# Patient Record
Sex: FEMALE | Race: WHITE | NOT HISPANIC OR LATINO | Employment: FULL TIME | URBAN - METROPOLITAN AREA
[De-identification: names, ages, dates, MRNs, and addresses within clinical notes are randomized per-mention and may not be internally consistent; named-entity substitution may affect disease eponyms.]

---

## 2023-02-11 ENCOUNTER — APPOINTMENT (OUTPATIENT)
Dept: RADIOLOGY | Facility: MEDICAL CENTER | Age: 38
End: 2023-02-11
Attending: EMERGENCY MEDICINE
Payer: COMMERCIAL

## 2023-02-11 ENCOUNTER — HOSPITAL ENCOUNTER (EMERGENCY)
Facility: MEDICAL CENTER | Age: 38
End: 2023-02-11
Attending: EMERGENCY MEDICINE
Payer: COMMERCIAL

## 2023-02-11 VITALS
TEMPERATURE: 97.2 F | BODY MASS INDEX: 19.32 KG/M2 | RESPIRATION RATE: 14 BRPM | DIASTOLIC BLOOD PRESSURE: 85 MMHG | WEIGHT: 105 LBS | HEIGHT: 62 IN | HEART RATE: 86 BPM | SYSTOLIC BLOOD PRESSURE: 122 MMHG | OXYGEN SATURATION: 98 %

## 2023-02-11 DIAGNOSIS — G57.62 MORTON'S NEUROMA OF LEFT FOOT: ICD-10-CM

## 2023-02-11 DIAGNOSIS — S63.641A GAMEKEEPER'S THUMB OF RIGHT HAND, INITIAL ENCOUNTER: ICD-10-CM

## 2023-02-11 PROCEDURE — 29125 APPL SHORT ARM SPLINT STATIC: CPT

## 2023-02-11 PROCEDURE — 99283 EMERGENCY DEPT VISIT LOW MDM: CPT

## 2023-02-11 PROCEDURE — 73130 X-RAY EXAM OF HAND: CPT | Mod: RT

## 2023-02-11 PROCEDURE — 302874 HCHG BANDAGE ACE 2 OR 3""

## 2023-02-11 PROCEDURE — 73630 X-RAY EXAM OF FOOT: CPT | Mod: LT

## 2023-02-11 NOTE — ED PROVIDER NOTES
"History obtained from: Patient.     CHIEF COMPLAINT  Chief Complaint   Patient presents with    Hand Pain     Right hand - x3 months    Foot Pain     X10 days - left     Seen at 12:23 PM    HPI  Cristina Nunn is a 37 y.o. right-hand-dominant female presents with right hand pain and left foot pain.  The patient has had 3 months of right hand pain, mostly along the right thumb.  It is worse throughout the day, best in the morning.  She first thinks she injured it when she was mountain biking, fell on the outstretched hand.  She was not evaluated except for telehealth, she has never had any x-rays of the hand.    The other issue is left foot pain.  She has had a week and a half of left first metatarsal pain.  The pain initially began after wearing some ill fitting high-heeled boots, the pain waxed and waned for the past week, got acutely worse last night with some feeling of crepitus, though did improve today.  She does have a history of planter fasciitis.    The reason for the ER visit today is that she typically works in [place] which is where her insurance is based out of, she has an HMO and they would require any prior authorization for visits that are out of network.  Because she is out of network now the ER visit will be covered where is anything else would not be.    REVIEW OF SYSTEMS  See HPI  PAST MEDICAL HISTORY   Planter fasciitis    SOCIAL HISTORY  Social History     Tobacco Use    Smoking status: Not on file    Smokeless tobacco: Not on file   Substance and Sexual Activity    Alcohol use: Not on file    Drug use: Not on file    Sexual activity: Not on file       SURGICAL HISTORY  patient denies any surgical history    CURRENT MEDICATIONS  Reviewed.  See Encounter Summary.     ALLERGIES  Allergies   Allergen Reactions    Penicillins Hives       PHYSICAL EXAM  VITAL SIGNS: /85   Pulse 86   Temp 36.2 °C (97.2 °F) (Temporal)   Resp 14   Ht 1.575 m (5' 2\")   Wt 47.6 kg (105 lb)   LMP " 01/25/2023 (Exact Date)   SpO2 98%   BMI 19.20 kg/m²   Constitutional: Awake, alert in no apparent distress.  HENT: Normocephalic, Bilateral external ears normal. Nose normal.   Eyes: Conjunctiva normal, non-icteric, EOMI.    Thorax & Lungs: Easy unlabored respirations  Cardiovascular:    Abdomen:  No distention  Skin: Visualized skin is  Dry, No erythema, No rash.   Extremities: Right hand: Normal appearance, no snuffbox tenderness, no tenderness over the scaphoid lunate region.  The patient does appear to have some UCL laxity of the thumb.    Left foot: Normal in appearance, no tenderness along the fifth metatarsal, no midfoot tenderness, no malleoli or tenderness.  The patient has point tenderness between the first and second MTP region.    Neurologic: Alert, Grossly non-focal.   Psychiatric: Affect and Mood normal    RADIOLOGY  DX-FOOT-COMPLETE 3+ LEFT   Final Result      No fracture or dislocation. Preserved joint spaces.      DX-HAND 3+ RIGHT   Final Result      No acute osseous abnormality.          Prior medical record, nursing notes and vital signs were reviewed. (See chart for details)    Differential diagnosis: Mechanism of the hand injury would suggest scaphoid fracture with AVN, though exam does not support this.  Other consideration would be scapholunate dissociation, also exam does not quite support this.  She does have some laxity of the UCL, therefore gamekeeper's thumb under consideration.    As for the foot, not concerning for gout, no evidence of hallux valgus deformity, location of pain not concerning for plantar fasciitis, most likely Engle's neuroma which would be very challenging to evaluate on x-ray.    Decision Making:  This is a pleasant 37 y.o. year old female who presents with subacute to chronic right hand pain, left foot pain.  Please see differential and discussion as above.  X-rays negative.  Patient will be placed in a thumb spica for the possible gamekeeper's thumb of the right  hand, though the utility is questionable as the injury occurred 3 months ago, therefore if she does indeed have a ligamentous rupture it would unlikely heal with immobilization.  Recommend she follow-up with a hand surgeon for this, with regards to the left foot she can call her insurance to see what podiatrist they recommend, otherwise she can buy some custom orthotics or even make some orthotics and cut a hole out where the location of the pain is.  Reassurance provided.    DISPOSITION:  Patient will be discharged home in good condition.    Discharge Medications:  There are no discharge medications for this patient.      The patient was discharged home (see d/c instructions) and told to return immediately for any signs or symptoms listed, or any worsening at all.  The patient verbally agreed to the discharge precautions and follow-up plan which is documented in EPIC.        FINAL IMPRESSION  1. Gamekeeper's thumb of right hand, initial encounter    2. Engle's neuroma of left foot

## 2023-02-11 NOTE — ED TRIAGE NOTES
"Chief Complaint   Patient presents with    Hand Pain     Right hand - x3 months    Foot Pain     X10 days - left       Pt is alert and oriented, speaking in full sentences, follows commands and responds appropriately to questions.      Pt placed in lobby. Pt educated on triage process and apologized for wait time. Pt encouraged to alert staff for any changes.     Patient and staff wearing appropriate PPE      /71   Pulse 75   Temp 36.8 °C (98.2 °F) (Temporal)   Resp 16   Ht 1.575 m (5' 2\")   Wt 47.6 kg (105 lb)   SpO2 99%       "

## 2023-02-11 NOTE — ED NOTES
Discharge education provided. Discharge paperwork signed by pt. All questions answered. All belongings with pt. Pt ambulated to lobby unassisted with steady gait.

## 2023-04-24 ENCOUNTER — HOSPITAL ENCOUNTER (EMERGENCY)
Facility: MEDICAL CENTER | Age: 38
End: 2023-04-24
Attending: EMERGENCY MEDICINE
Payer: COMMERCIAL

## 2023-04-24 VITALS
RESPIRATION RATE: 19 BRPM | BODY MASS INDEX: 18.01 KG/M2 | WEIGHT: 97.88 LBS | SYSTOLIC BLOOD PRESSURE: 93 MMHG | HEIGHT: 62 IN | OXYGEN SATURATION: 99 % | DIASTOLIC BLOOD PRESSURE: 50 MMHG | TEMPERATURE: 98.2 F | HEART RATE: 65 BPM

## 2023-04-24 DIAGNOSIS — R29.0 CARPOPEDAL SPASM: ICD-10-CM

## 2023-04-24 DIAGNOSIS — I95.1 ORTHOSTATIC SYNCOPE: ICD-10-CM

## 2023-04-24 LAB — EKG IMPRESSION: NORMAL

## 2023-04-24 PROCEDURE — 93005 ELECTROCARDIOGRAM TRACING: CPT

## 2023-04-24 PROCEDURE — 99283 EMERGENCY DEPT VISIT LOW MDM: CPT

## 2023-04-24 PROCEDURE — 93005 ELECTROCARDIOGRAM TRACING: CPT | Performed by: EMERGENCY MEDICINE

## 2023-04-25 NOTE — ED TRIAGE NOTES
"Chief Complaint   Patient presents with    Syncope     Pt states she was getting out of bathtub when she starting feeling dizzy/lightheaded and nauseous. -head strike.  Pt states initially following event she felt bilateral numbness/tingling in hands and feet that is improving.          39 yo F to triage for above complaint. Hx of same. Denies CP. EKG ordered.     Pt placed in lobby. Pt educated on triage process. Pt encouraged to alert staff for any changes.     Patient and staff wearing appropriate PPE    BP 98/56   Pulse 88   Temp 36 °C (96.8 °F) (Temporal)   Resp 16   Ht 1.575 m (5' 2\")   Wt 44.4 kg (97 lb 14.2 oz)   SpO2 100%   BMI 17.90 kg/m²     "

## 2023-04-25 NOTE — ED NOTES
Pt discharged to ED Lobby. GCS 15. Pt in possession of belongings. Pt provided discharge education and information pertaining to medications and follow up appointments. Pt received copy of discharge instructions and verbalized understanding.     Vitals:    04/24/23 2100   BP: 93/50   Pulse: 65   Resp: 19   Temp: 36.8 °C (98.2 °F)   SpO2: 99%

## 2023-04-25 NOTE — DISCHARGE INSTRUCTIONS
Syncope (Fainting Episode)    Return for fainting without warning, fainting during exertion or for fainting with leg swelling or headache.      You have had a syncopal (fainting) spell. A fainting episode is a sudden, short-lived loss of consciousness. It results in complete recovery. It occurs because there has been a temporary shortage of oxygen and/or glucose (sugar) to the brain.    causes  Blood pressure pills and other medications that may lower blood pressure below normal. Sudden changes in posture (sudden standing).  Over medication. Take your medications as directed.  Standing too long. This can cause blood to pool in the legs.  Seizure disorders.  Low blood sugar (hypoglycemia) of diabetes. This more commonly causes coma.  Bearing down to go to the bathroom. This can cause your blood pressure to rise suddenly. Your body compensates by making the blood pressure too low when you stop bearing down.  Hardening of the arteries where the brain temporarily does not receive enough blood.  Irregular heart beat and circulatory problems.  Fear, emotional distress, injury, sight of blood, or illness.    Your caregiver will send you home if the syncope was from benign causes (not a reason to worry). Depending on your age and health, you may stay to be monitored and observed. If you return home, have someone stay with you if your caregiver feels that is desirable.    It is very important to keep all follow-up referrals and appointments in order to properly manage this condition.    This is a serious problem which can lead to serious illness and death if not carefully managed.      WARNING: Do not drive or operate machinery until your caregiver feels that it is safe for you to do so.    seek immediate medical attention if:  You have another fainting episode or faint while lying or sitting down. DO NOT DRIVE YOURSELF. Call 911 if no other help is available.  You have chest pain, nausea (feeling sick to your stomach),  vomiting or abdominal pain.  You have an irregular heartbeat or one that is very fast (pulse over 120 beats per minute).  You have a loss of feeling in some part of your body or lose movement in your arms or legs.  You have difficulty with speech, confusion, severe weakness, or visual problems.  You become sweaty and/or feel light headed.    MAKE SURE YOU ARE RE-CHECKED AS INSTRUCTED    Document Released: 12/18/2006  Document Re-Released: 01/29/2008  [employer]® Patient Information ©2008 [employer], [employer].

## 2023-04-25 NOTE — ED PROVIDER NOTES
ED Provider Note    CHIEF COMPLAINT  Chief Complaint   Patient presents with    Syncope     Pt states she was getting out of bathtub when she starting feeling dizzy/lightheaded and nauseous. -head strike.  Pt states initially following event she felt bilateral numbness/tingling in hands and feet that is improving.          LIMITATION TO HISTORY   Select: None    HPI    Cristina Nunn is a 38 y.o. female who presents to the Emergency Department for syncope and carpopedal spasm.  The patient had not eaten dinner tonight and did not drink much today.  She took a hot bath and when she stood felt dizzy and nauseated.  She left the room continued to feel poorly, crawled back to the bathroom and fainted on the floor.  When she awakened she developed tingling in the hands and feet and then spasm of the hands which was painful and lasted for about 10 minutes.  That since resolved.  She has fainted before.  She normally has low normal blood pressure.  No palpitations arrhythmia heart failure headache calf pain leg swelling vomiting diarrhea pregnancy fever urinary symptoms GI bleed or vaginal bleeding has occurred.    OUTSIDE HISTORIAN(S):  Select: None    EXTERNAL RECORDS REVIEWED  Select: None    REVIEW OF SYSTEMS  Pertinent positives include: Syncope dizziness nausea carpopedal spasm tingling.  Pertinent negatives include: Pain palpitations leg swelling.      PAST MEDICAL HISTORY  Past Medical History:   Diagnosis Date    Raynaud disease        FAMILY HISTORY  No family history of sudden death or heart failure    SOCIAL HISTORY  Social History     Tobacco Use    Smoking status: Never    Smokeless tobacco: Never   Vaping Use    Vaping Use: Never used   Substance Use Topics    Alcohol use: Never    Drug use: Never     Social History     Substance and Sexual Activity   Drug Use Never       CURRENT MEDICATIONS  None    ALLERGIES  Allergies   Allergen Reactions    Penicillins Hives       PHYSICAL EXAM  VITAL SIGNS: BP 98/56   " Pulse 88   Temp 36 °C (96.8 °F) (Temporal)   Resp 16   Ht 1.575 m (5' 2\")   Wt 44.4 kg (97 lb 14.2 oz)   SpO2 100%   BMI 17.90 kg/m²   Reviewed and normal blood pressure  Constitutional: Well developed, Well nourished, thin, completely well-appearing.  HENT: Normocephalic, atraumatic, bilateral external ears normal, No intraoral erythema, edema, exudate  Eyes: PERRLA, conjunctiva pink, no scleral icterus.   Cardiovascular: Regular rate and rhythm. No murmurs, rubs or gallops.  No dependent edema or calf tenderness  Respiratory: Lungs clear to auscultation bilaterally. No wheezes, rales, or rhonchi.  Abdominal:  Abdomen soft, non-tender, non distended. No rebound, or guarding.    Skin: No erythema, no rash. No wounds or bruising.  Genitourinary: No costovertebral angle tenderness.   Musculoskeletal: no deformities.   Neurologic: Alert & oriented x 3, cranial nerves 2-12 intact by passive exam.  Moves 4 limbs with symmetric strength.  Psychiatric: Affect normal, Judgment normal, Mood normal.         EKG Interpretation by me    Indication: Syncope    Rhythm: normal sinus   Rate: Normal at 70  Axis: normal  Ectopy: none  Conduction: normal  ST/T Waves: no acute change  Q Waves: none  R Wave progression: normal  Hypertrophy changes: Absent    Clinical Impression: Normal sinus rhythm      ED COURSE:      INTERVENTIONS BY ME:  Declined by patient and her  nurse      MEDICAL DECISION MAKING:  PROBLEMS EVALUATED THIS VISIT:  This patient presents with syncope that was very clearly orthostatic syncope.  She is likely mildly dehydrated.  Sitting in the hot tub likely caused vasodilation and more relative hypovolemia.  Afterwards it appears she hyperventilated to the point she developed carpopedal spasms.  I discussed obtaining labs, checking orthostatic vital signs and giving IV fluids but the patient and her significant other declined.  I believe this is safe.    RISK:  Low    Diagnostic tests and prescription " drugs considered including, but not limited to: Select: Basic metabolic panel, pregnancy test, orthostatics, IV saline infusion.       PLAN:    Syncope handout given    Return for fainting without warning with exertion with headache with chest pain with palpitations or with painful leg swelling    Followup:  [facility] Emergency Dept  [facility] [facility] [address] [phone]    As needed      CONDITION: Stable.     FINAL IMPRESSION  1. Orthostatic syncope    2. Carpopedal spasm      Electronically signed by: Enoc Lucio M.D., 4/24/2023 9:37 PM

## 2023-04-25 NOTE — ED NOTES
PT to room via WC. Transferred self to bed. Changed into gown and placed on monitor. Chart up for ERP.

## 2023-10-24 PROBLEM — M65.30 TRIGGER FINGER OF LEFT HAND: Status: ACTIVE | Noted: 2023-10-24

## 2024-01-02 ENCOUNTER — HOSPITAL ENCOUNTER (OUTPATIENT)
Dept: LAB | Facility: MEDICAL CENTER | Age: 39
End: 2024-01-02
Payer: COMMERCIAL

## 2024-01-02 PROCEDURE — 88175 CYTOPATH C/V AUTO FLUID REDO: CPT

## 2024-01-02 PROCEDURE — 87624 HPV HI-RISK TYP POOLED RSLT: CPT

## 2024-01-05 ENCOUNTER — APPOINTMENT (OUTPATIENT)
Dept: RADIOLOGY | Facility: MEDICAL CENTER | Age: 39
End: 2024-01-05
Attending: FAMILY MEDICINE
Payer: COMMERCIAL

## 2024-01-08 LAB
CYTOLOGIST CVX/VAG CYTO: ABNORMAL
CYTOLOGY CVX/VAG DOC CYTO: ABNORMAL
CYTOLOGY CVX/VAG DOC THIN PREP: ABNORMAL
DX ICD CODE: ABNORMAL
HPV I/H RISK 4 DNA CVX QL PROBE+SIG AMP: NEGATIVE
NOTE NL11727A: ABNORMAL
OTHER STN SPEC: ABNORMAL
PATHOLOGIST CVX/VAG CYTO: ABNORMAL
STAT OF ADQ CVX/VAG CYTO-IMP: ABNORMAL

## 2024-01-11 ENCOUNTER — HOSPITAL ENCOUNTER (OUTPATIENT)
Dept: RADIOLOGY | Facility: MEDICAL CENTER | Age: 39
End: 2024-01-11
Attending: FAMILY MEDICINE
Payer: COMMERCIAL

## 2024-01-11 DIAGNOSIS — Z13.820 OSTEOPOROSIS SCREENING: ICD-10-CM

## 2024-01-11 DIAGNOSIS — Z87.312 HISTORY OF STRESS FRACTURE: ICD-10-CM

## 2024-01-11 PROCEDURE — 77080 DXA BONE DENSITY AXIAL: CPT

## 2024-02-14 ENCOUNTER — HOSPITAL ENCOUNTER (OUTPATIENT)
Dept: LAB | Facility: MEDICAL CENTER | Age: 39
End: 2024-02-14
Attending: INTERNAL MEDICINE
Payer: COMMERCIAL

## 2024-02-14 LAB
CRP SERPL HS-MCNC: <0.3 MG/DL (ref 0–0.75)
THYROPEROXIDASE AB SERPL-ACNC: 13 IU/ML (ref 0–9)

## 2024-02-14 PROCEDURE — 86140 C-REACTIVE PROTEIN: CPT

## 2024-02-14 PROCEDURE — 86376 MICROSOMAL ANTIBODY EACH: CPT

## 2024-02-14 PROCEDURE — 86008 ALLG SPEC IGE RECOMB EA: CPT | Mod: 91

## 2024-02-14 PROCEDURE — 85652 RBC SED RATE AUTOMATED: CPT

## 2024-02-14 PROCEDURE — 82784 ASSAY IGA/IGD/IGG/IGM EACH: CPT

## 2024-02-14 PROCEDURE — 36415 COLL VENOUS BLD VENIPUNCTURE: CPT

## 2024-02-14 PROCEDURE — 83520 IMMUNOASSAY QUANT NOS NONAB: CPT

## 2024-02-14 PROCEDURE — 88185 FLOWCYTOMETRY/TC ADD-ON: CPT | Mod: 91

## 2024-02-14 PROCEDURE — 86800 THYROGLOBULIN ANTIBODY: CPT

## 2024-02-14 PROCEDURE — 88184 FLOWCYTOMETRY/ TC 1 MARKER: CPT

## 2024-02-14 PROCEDURE — 86003 ALLG SPEC IGE CRUDE XTRC EA: CPT

## 2024-02-14 PROCEDURE — 86231 EMA EACH IG CLASS: CPT

## 2024-02-14 PROCEDURE — 83516 IMMUNOASSAY NONANTIBODY: CPT | Mod: 91

## 2024-02-14 PROCEDURE — 86364 TISS TRNSGLTMNASE EA IG CLAS: CPT

## 2024-02-14 PROCEDURE — 82785 ASSAY OF IGE: CPT

## 2024-02-15 LAB — ERYTHROCYTE [SEDIMENTATION RATE] IN BLOOD BY WESTERGREN METHOD: 7 MM/HOUR (ref 0–25)

## 2024-02-16 LAB
DEPRECATED MISC ALLERGEN IGE RAST QL: NORMAL
EGG WHITE IGE QN: 0.16 KU/L
EGG YOLK IGE QN: <0.1 KU/L
IGE SERPL-ACNC: 110 KU/L
OVALB IGE QN: <0.1 KU/L
OVOMUCOID IGE QN: 0.12 KU/L
THYROGLOB AB SERPL-ACNC: <0.9 IU/ML (ref 0–4)
TRYPTASE SERPL-MCNC: 7 UG/L

## 2024-02-18 LAB
ENDOMYSIUM IGA TITR SER IF: NORMAL {TITER}
TTG IGA SER IA-ACNC: <1.02 FLU (ref 0–4.99)

## 2024-02-20 LAB — URTIIND BASO ACT Q4770: 0 %

## 2024-02-21 LAB
MYELOPEROXIDASE AB SER-ACNC: 0 AU/ML (ref 0–19)
PROTEINASE3 AB SER-ACNC: 0 AU/ML (ref 0–19)

## 2024-02-22 LAB — IGA SERPL-MCNC: 196 MG/DL (ref 68–408)

## 2024-03-04 ENCOUNTER — HOSPITAL ENCOUNTER (OUTPATIENT)
Dept: RADIOLOGY | Facility: MEDICAL CENTER | Age: 39
End: 2024-03-04
Attending: FAMILY MEDICINE
Payer: COMMERCIAL

## 2024-03-04 DIAGNOSIS — R10.12 ABDOMINAL PAIN, LEFT UPPER QUADRANT: ICD-10-CM

## 2024-03-04 PROCEDURE — 76705 ECHO EXAM OF ABDOMEN: CPT

## 2024-03-12 ENCOUNTER — APPOINTMENT (OUTPATIENT)
Dept: RADIOLOGY | Facility: MEDICAL CENTER | Age: 39
End: 2024-03-12
Attending: FAMILY MEDICINE
Payer: COMMERCIAL

## 2024-03-12 DIAGNOSIS — R10.12 ABDOMINAL PAIN, LEFT UPPER QUADRANT: ICD-10-CM

## 2024-03-17 ENCOUNTER — APPOINTMENT (OUTPATIENT)
Dept: RADIOLOGY | Facility: MEDICAL CENTER | Age: 39
End: 2024-03-17
Attending: FAMILY MEDICINE
Payer: COMMERCIAL

## 2024-03-18 ENCOUNTER — HOSPITAL ENCOUNTER (OUTPATIENT)
Dept: LAB | Facility: MEDICAL CENTER | Age: 39
End: 2024-03-18
Attending: FAMILY MEDICINE
Payer: COMMERCIAL

## 2024-03-18 LAB
T3FREE SERPL-MCNC: 2.57 PG/ML (ref 2–4.4)
T4 FREE SERPL-MCNC: 1.35 NG/DL (ref 0.93–1.7)
THYROPEROXIDASE AB SERPL-ACNC: <9 IU/ML (ref 0–9)
TSH SERPL DL<=0.005 MIU/L-ACNC: 1.36 UIU/ML (ref 0.38–5.33)

## 2024-03-18 PROCEDURE — 84443 ASSAY THYROID STIM HORMONE: CPT

## 2024-03-18 PROCEDURE — 86800 THYROGLOBULIN ANTIBODY: CPT

## 2024-03-18 PROCEDURE — 36415 COLL VENOUS BLD VENIPUNCTURE: CPT

## 2024-03-18 PROCEDURE — 84482 T3 REVERSE: CPT

## 2024-03-18 PROCEDURE — 84439 ASSAY OF FREE THYROXINE: CPT

## 2024-03-18 PROCEDURE — 86376 MICROSOMAL ANTIBODY EACH: CPT

## 2024-03-18 PROCEDURE — 84481 FREE ASSAY (FT-3): CPT

## 2024-03-20 LAB — THYROGLOB AB SERPL-ACNC: <0.9 IU/ML (ref 0–4)

## 2024-03-21 LAB — T3REVERSE SERPL-MCNC: 15.6 NG/DL (ref 9–27)

## 2024-04-03 ENCOUNTER — APPOINTMENT (OUTPATIENT)
Dept: RADIOLOGY | Facility: MEDICAL CENTER | Age: 39
End: 2024-04-03
Attending: FAMILY MEDICINE
Payer: COMMERCIAL

## 2024-04-04 ENCOUNTER — HOSPITAL ENCOUNTER (OUTPATIENT)
Dept: RADIOLOGY | Facility: MEDICAL CENTER | Age: 39
End: 2024-04-04
Attending: FAMILY MEDICINE
Payer: COMMERCIAL

## 2024-04-04 DIAGNOSIS — R10.12 LEFT UPPER QUADRANT PAIN: ICD-10-CM

## 2024-04-04 PROCEDURE — 74150 CT ABDOMEN W/O CONTRAST: CPT

## 2025-04-01 ENCOUNTER — HOSPITAL ENCOUNTER (OUTPATIENT)
Dept: LAB | Facility: MEDICAL CENTER | Age: 40
End: 2025-04-01
Attending: FAMILY MEDICINE
Payer: COMMERCIAL

## 2025-04-01 LAB
25(OH)D3 SERPL-MCNC: 23 NG/ML (ref 30–100)
ALBUMIN SERPL BCP-MCNC: 4.6 G/DL (ref 3.2–4.9)
ALBUMIN/GLOB SERPL: 1.5 G/DL
ALP SERPL-CCNC: 56 U/L (ref 30–99)
ALT SERPL-CCNC: 16 U/L (ref 2–50)
ANION GAP SERPL CALC-SCNC: 11 MMOL/L (ref 7–16)
AST SERPL-CCNC: 20 U/L (ref 12–45)
BASOPHILS # BLD AUTO: 1.1 % (ref 0–1.8)
BASOPHILS # BLD: 0.07 K/UL (ref 0–0.12)
BILIRUB SERPL-MCNC: 0.3 MG/DL (ref 0.1–1.5)
BUN SERPL-MCNC: 17 MG/DL (ref 8–22)
CALCIUM ALBUM COR SERPL-MCNC: 9.3 MG/DL (ref 8.5–10.5)
CALCIUM SERPL-MCNC: 9.8 MG/DL (ref 8.5–10.5)
CHLORIDE SERPL-SCNC: 101 MMOL/L (ref 96–112)
CHOLEST SERPL-MCNC: 231 MG/DL (ref 100–199)
CO2 SERPL-SCNC: 24 MMOL/L (ref 20–33)
CREAT SERPL-MCNC: 0.75 MG/DL (ref 0.5–1.4)
EOSINOPHIL # BLD AUTO: 0.1 K/UL (ref 0–0.51)
EOSINOPHIL NFR BLD: 1.6 % (ref 0–6.9)
ERYTHROCYTE [DISTWIDTH] IN BLOOD BY AUTOMATED COUNT: 43 FL (ref 35.9–50)
FASTING STATUS PATIENT QL REPORTED: NORMAL
FERRITIN SERPL-MCNC: 25.1 NG/ML (ref 10–291)
FOLATE SERPL-MCNC: 14.1 NG/ML
GFR SERPLBLD CREATININE-BSD FMLA CKD-EPI: 104 ML/MIN/1.73 M 2
GGT SERPL-CCNC: 9 U/L (ref 7–34)
GLOBULIN SER CALC-MCNC: 3 G/DL (ref 1.9–3.5)
GLUCOSE SERPL-MCNC: 90 MG/DL (ref 65–99)
HCT VFR BLD AUTO: 45.3 % (ref 37–47)
HDLC SERPL-MCNC: 82 MG/DL
HGB BLD-MCNC: 14.7 G/DL (ref 12–16)
IMM GRANULOCYTES # BLD AUTO: 0.02 K/UL (ref 0–0.11)
IMM GRANULOCYTES NFR BLD AUTO: 0.3 % (ref 0–0.9)
IRON SATN MFR SERPL: 24 % (ref 15–55)
IRON SERPL-MCNC: 77 UG/DL (ref 40–170)
LDH SERPL L TO P-CCNC: 129 U/L (ref 107–266)
LDLC SERPL CALC-MCNC: 141 MG/DL
LYMPHOCYTES # BLD AUTO: 1.96 K/UL (ref 1–4.8)
LYMPHOCYTES NFR BLD: 31.3 % (ref 22–41)
MCH RBC QN AUTO: 31.5 PG (ref 27–33)
MCHC RBC AUTO-ENTMCNC: 32.5 G/DL (ref 32.2–35.5)
MCV RBC AUTO: 97.2 FL (ref 81.4–97.8)
MONOCYTES # BLD AUTO: 0.42 K/UL (ref 0–0.85)
MONOCYTES NFR BLD AUTO: 6.7 % (ref 0–13.4)
NEUTROPHILS # BLD AUTO: 3.7 K/UL (ref 1.82–7.42)
NEUTROPHILS NFR BLD: 59 % (ref 44–72)
NRBC # BLD AUTO: 0 K/UL
NRBC BLD-RTO: 0 /100 WBC (ref 0–0.2)
PHOSPHATE SERPL-MCNC: 3.7 MG/DL (ref 2.5–4.5)
PLATELET # BLD AUTO: 311 K/UL (ref 164–446)
PMV BLD AUTO: 11 FL (ref 9–12.9)
POTASSIUM SERPL-SCNC: 4.5 MMOL/L (ref 3.6–5.5)
PROT SERPL-MCNC: 7.6 G/DL (ref 6–8.2)
RBC # BLD AUTO: 4.66 M/UL (ref 4.2–5.4)
RHEUMATOID FACT SER IA-ACNC: <10 IU/ML (ref 0–14)
SODIUM SERPL-SCNC: 136 MMOL/L (ref 135–145)
T3FREE SERPL-MCNC: 2.77 PG/ML (ref 2–4.4)
T4 FREE SERPL-MCNC: 1.22 NG/DL (ref 0.93–1.7)
TIBC SERPL-MCNC: 324 UG/DL (ref 250–450)
TRIGL SERPL-MCNC: 42 MG/DL (ref 0–149)
TSH SERPL-ACNC: 2.75 UIU/ML (ref 0.35–5.5)
UIBC SERPL-MCNC: 247 UG/DL (ref 110–370)
URATE SERPL-MCNC: 3.8 MG/DL (ref 1.9–8.2)
VIT B12 SERPL-MCNC: 473 PG/ML (ref 211–911)
WBC # BLD AUTO: 6.3 K/UL (ref 4.8–10.8)

## 2025-04-01 PROCEDURE — 83615 LACTATE (LD) (LDH) ENZYME: CPT

## 2025-04-01 PROCEDURE — 83540 ASSAY OF IRON: CPT

## 2025-04-01 PROCEDURE — 86376 MICROSOMAL ANTIBODY EACH: CPT

## 2025-04-01 PROCEDURE — 84591 ASSAY OF NOS VITAMIN: CPT

## 2025-04-01 PROCEDURE — 84207 ASSAY OF VITAMIN B-6: CPT

## 2025-04-01 PROCEDURE — 84550 ASSAY OF BLOOD/URIC ACID: CPT

## 2025-04-01 PROCEDURE — 84481 FREE ASSAY (FT-3): CPT

## 2025-04-01 PROCEDURE — 84439 ASSAY OF FREE THYROXINE: CPT

## 2025-04-01 PROCEDURE — 84443 ASSAY THYROID STIM HORMONE: CPT

## 2025-04-01 PROCEDURE — 82306 VITAMIN D 25 HYDROXY: CPT

## 2025-04-01 PROCEDURE — 80053 COMPREHEN METABOLIC PANEL: CPT

## 2025-04-01 PROCEDURE — 80061 LIPID PANEL: CPT

## 2025-04-01 PROCEDURE — 85025 COMPLETE CBC W/AUTO DIFF WBC: CPT

## 2025-04-01 PROCEDURE — 84100 ASSAY OF PHOSPHORUS: CPT

## 2025-04-01 PROCEDURE — 84446 ASSAY OF VITAMIN E: CPT

## 2025-04-01 PROCEDURE — 84252 ASSAY OF VITAMIN B-2: CPT

## 2025-04-01 PROCEDURE — 86800 THYROGLOBULIN ANTIBODY: CPT

## 2025-04-01 PROCEDURE — 36415 COLL VENOUS BLD VENIPUNCTURE: CPT

## 2025-04-01 PROCEDURE — 82977 ASSAY OF GGT: CPT

## 2025-04-01 PROCEDURE — 82607 VITAMIN B-12: CPT

## 2025-04-01 PROCEDURE — 86431 RHEUMATOID FACTOR QUANT: CPT

## 2025-04-01 PROCEDURE — 82728 ASSAY OF FERRITIN: CPT

## 2025-04-01 PROCEDURE — 84630 ASSAY OF ZINC: CPT

## 2025-04-01 PROCEDURE — 83550 IRON BINDING TEST: CPT

## 2025-04-01 PROCEDURE — 82746 ASSAY OF FOLIC ACID SERUM: CPT

## 2025-04-02 LAB
THYROGLOB AB SERPL-ACNC: <1.5 IU/ML (ref 0–4)
THYROPEROXIDASE AB SERPL-ACNC: 4.4 IU/ML (ref 0–9)

## 2025-04-03 LAB — ZINC SERPL-MCNC: 89.9 UG/DL (ref 60–120)

## 2025-04-04 LAB
A-TOCOPHEROL VIT E SERPL-MCNC: 10.8 MG/L (ref 5.5–18)
BETA+GAMMA TOCOPHEROL SERPL-MCNC: 1 MG/L (ref 0–6)
VIT B6 SERPL-MCNC: 106.2 NMOL/L (ref 20–125)

## 2025-04-07 LAB
NIACIN SERPL-MCNC: NORMAL NG/ML
NICOTINAMIDE SERPL-MCNC: 14 NG/ML
NICOTINURATE SERPL-MCNC: NORMAL NG/ML

## 2025-04-08 LAB — VIT B2 SERPL-SCNC: 9 NMOL/L (ref 5–50)

## 2025-05-22 ENCOUNTER — OFFICE VISIT (OUTPATIENT)
Dept: SPORTS MEDICINE | Facility: OTHER | Age: 40
End: 2025-05-22
Payer: COMMERCIAL

## 2025-05-22 ENCOUNTER — ANCILLARY PROCEDURE (OUTPATIENT)
Dept: SPORTS MEDICINE | Facility: OTHER | Age: 40
End: 2025-05-22
Attending: FAMILY MEDICINE
Payer: COMMERCIAL

## 2025-05-22 VITALS
WEIGHT: 100 LBS | RESPIRATION RATE: 12 BRPM | TEMPERATURE: 97.6 F | DIASTOLIC BLOOD PRESSURE: 66 MMHG | OXYGEN SATURATION: 98 % | HEART RATE: 74 BPM | SYSTOLIC BLOOD PRESSURE: 108 MMHG | BODY MASS INDEX: 18.4 KG/M2 | HEIGHT: 62 IN

## 2025-05-22 DIAGNOSIS — S39.011A STRAIN OF RECTUS ABDOMINIS MUSCLE, INITIAL ENCOUNTER: Primary | ICD-10-CM

## 2025-05-22 PROCEDURE — 76882 US LMTD JT/FCL EVL NVASC XTR: CPT | Performed by: FAMILY MEDICINE

## 2025-05-22 PROCEDURE — 3074F SYST BP LT 130 MM HG: CPT | Performed by: FAMILY MEDICINE

## 2025-05-22 PROCEDURE — 3078F DIAST BP <80 MM HG: CPT | Performed by: FAMILY MEDICINE

## 2025-05-22 PROCEDURE — 99214 OFFICE O/P EST MOD 30 MIN: CPT | Performed by: FAMILY MEDICINE

## 2025-05-22 ASSESSMENT — ENCOUNTER SYMPTOMS
FEVER: 0
DIZZINESS: 0
VOMITING: 0
CHILLS: 0
NAUSEA: 0
SHORTNESS OF BREATH: 0

## 2025-05-22 ASSESSMENT — FIBROSIS 4 INDEX: FIB4 SCORE: 0.63

## 2025-05-22 NOTE — PROGRESS NOTES
Chief Complaint   Patient presents with    Abdominal Pain     L side towards center abdominal strain      Subjective     Referred by Self-referred for evaluation of LEFT upper abdominal pain  Initial injury approximately a year and 3 months ago (roughly February 2024)  She was doing a full weighted sit up and felt sudden pain in the LEFT upper abdomen  She felt she strained her abdominal muscle  Recovery from that incident was approximately 2 days  Her pain has been fairly persistent, but not necessarily as severe as it was during the initial injury  Pain can vary from dull to achy and even burning  She does reproduce pain and does notice that when she engages her abdomen she has a sensation of something moving in that area  Occasionally she feels the movement without necessarily experiencing any pain  She has avoided sit ups secondary to discomfort  Pain can be as severe as 4 out of 10  No radiation  No numbness or tingling  She denies any hyperalgesia or hypersensitivity of skin    She did undergo ultrasound of the abdominal wall  She underwent some form of physical therapy, but her therapist did not really seem to know how to manage her issue    Works as a professor  Activities include mountain biking, [employer] workouts    Review of Systems   Constitutional:  Negative for chills and fever.   Respiratory:  Negative for shortness of breath.    Cardiovascular:  Negative for chest pain.   Gastrointestinal:  Negative for nausea and vomiting.   Neurological:  Negative for dizziness.     PMH:  has a past medical history of Raynaud disease.  MEDS: Current Medications[1]  ALLERGIES: Allergies[2]  SURGHX: Past Surgical History[3]  SOCHX:  reports that she has never smoked. She has never used smokeless tobacco. She reports that she does not drink alcohol and does not use drugs.  FH: Family history was reviewed, no pertinent findings to report    Objective   /66 (BP Location: Left arm, Patient Position: Sitting,  "BP Cuff Size: Adult)   Pulse 74   Temp 36.4 °C (97.6 °F) (Temporal)   Resp 12   Ht 1.575 m (5' 2\")   Wt 45.4 kg (100 lb)   SpO2 98%   BMI 18.29 kg/m²     No acute distress  Positive POINT tenderness at the LEFT lower rib margin along the costal cartilage roughly at the level of 7th and 8th ribs  POSITIVE Carnett's sign, but only when she does a pilates Teaser   While engaging upper and lower abdominal muscles together    LEFT upper abdominal musculature along the level of the    1. Strain of rectus abdominis muscle, initial encounter (LEFT UPPER costal cartilageat rib margin)  US-POCT US MUSCULOSKELETAL LIMITED JOINTT    Referral to Physical Therapy        Initial injury approximately a year and 3 months ago (roughly February 2024)  She was doing a full weighted sit up and felt sudden pain in the LEFT upper abdomen    Referral physical therapy for Graston technique    ADDENDUM:  Left voicemail for patient, advising calcifications in the costochondral cartilage are normal finding after age 30  Also advised we will proceed with formal physical therapy for manual therapy/Graston method  If she fails therapy we will consider barbotage procedure under ultrasound guidance    She has been instructed to follow-up in 6 weeks or so after she been doing therapy for about a month                      Reading Physician Reading Date Result Priority   Khoa Nance M.D.  [phone] 5/22/2025 Routine     Narrative  LEFT proximal rectus abdominis insertion Limited musculoskeletal motion  Indication LEFT proximal rectus/abdominal wall pain  Impression:  Calcification noted within the costal cartilage along with a calcification  noted at the insertion of the proximal rectus abdominis in the region of  the patient's area of tenderness/pain        Exam Ended: 05/22/25 11:12 AM Last Resulted: 05/22/25  5:43 PM     Self-referred         [1]   Current Outpatient Medications:     methylPREDNISolone (MEDROL DOSEPAK) 4 MG Tablet " Therapy Pack, Follow schedule on package instructions., Disp: 21 Tablet, Rfl: 0    valacyclovir (VALTREX) 1 GM Tab, Take 500 mg by mouth 2 times a day., Disp: , Rfl:     valACYclovir (VALTREX) 500 MG Tab, Take 1 Tablet by mouth every day., Disp: , Rfl:   [2]   Allergies  Allergen Reactions    Gluten Meal     Penicillins Hives and Rash   [3]   Past Surgical History:  Procedure Laterality Date    PB INCISE FINGER TENDON SHEATH Left 12/8/2023    Procedure: LEFT SMALL FINGER TRIGGER RELEASE;  Surgeon: Delmi Sanders M.D.;  Location: [facility];  Service: Orthopedics

## 2025-05-23 NOTE — Clinical Note
REFERRAL APPROVAL NOTICE         Sent on May 23, 2025                   Cristina Amador  [address] [facility] [address]                   Dear Ms. Amador,    After a careful review of the medical information and benefit coverage, [facility] has processed your referral. See below for additional details.    If applicable, you must be actively enrolled with your insurance for coverage of the authorized service. If you have any questions regarding your coverage, please contact your insurance directly.    REFERRAL INFORMATION   Referral #:  [id number]  Referred-To Provider    Referred-By Provider:  Physical Therapy    Khoa Nance M.D.   [facility]      [address]  [facility] [address]  [phone] [address] [facility] [address] [id number]  [facility] [address]  [phone]    Referral Start Date:  05/22/2025  Referral End Date:   05/22/2026             SCHEDULING  If you do not already have an appointment, please call [phone] to make an appointment.     MORE INFORMATION  If you do not already have a [facility] account, sign up at: [email or web address]  You can access your medical information, make appointments, see lab results, billing information, and more.  If you have questions regarding this referral, please contact  the [facility] Referrals department at:             [phone]. Monday - Friday 8:00AM - 5:00PM.     Sincerely,    [facility]

## 2025-06-19 ENCOUNTER — OFFICE VISIT (OUTPATIENT)
Dept: URGENT CARE | Facility: PHYSICIAN GROUP | Age: 40
End: 2025-06-19
Payer: COMMERCIAL

## 2025-06-19 VITALS
TEMPERATURE: 98.3 F | HEART RATE: 88 BPM | OXYGEN SATURATION: 99 % | RESPIRATION RATE: 17 BRPM | HEIGHT: 62 IN | DIASTOLIC BLOOD PRESSURE: 64 MMHG | BODY MASS INDEX: 19.03 KG/M2 | SYSTOLIC BLOOD PRESSURE: 106 MMHG | WEIGHT: 103.4 LBS

## 2025-06-19 DIAGNOSIS — R21 RASH: Primary | ICD-10-CM

## 2025-06-19 PROCEDURE — 3074F SYST BP LT 130 MM HG: CPT | Performed by: PHYSICIAN ASSISTANT

## 2025-06-19 PROCEDURE — 3078F DIAST BP <80 MM HG: CPT | Performed by: PHYSICIAN ASSISTANT

## 2025-06-19 PROCEDURE — 99203 OFFICE O/P NEW LOW 30 MIN: CPT | Performed by: PHYSICIAN ASSISTANT

## 2025-06-19 RX ORDER — DOXYCYCLINE HYCLATE 100 MG
100 TABLET ORAL 2 TIMES DAILY
Qty: 20 TABLET | Refills: 0 | Status: SHIPPED | OUTPATIENT
Start: 2025-06-19 | End: 2025-06-29

## 2025-06-19 ASSESSMENT — FIBROSIS 4 INDEX: FIB4 SCORE: 0.63

## 2025-06-19 NOTE — PROGRESS NOTES
"Subjective:   Cristina Amador is a 40 y.o. female who presents for Rash (Bulls eye rash on R leg notice today)      HPI  The patient presents to the Urgent Care with complaints of a bull's-eye target lesion rash to the right thigh she noticed today.  She was recently in the sun and got a sunburn.  She was around lime juice but not until after the sunburn.  She has been mountain biking and in the outdoors in the [facility].  Denies any noticeable tic she was aware of. The rash does not bother her.  There is no pain.  There is no itching.  She denies any other symptoms.  She otherwise feels well.  She denies any fever, chills, muscle aches, headaches, joint aches.        Past Medical History[1]  Allergies[2]     Objective:     /64   Pulse 88   Temp 36.8 °C (98.3 °F)   Resp 17   Ht 1.575 m (5' 2\")   Wt 46.9 kg (103 lb 6.4 oz)   SpO2 99%   BMI 18.91 kg/m²     Physical Exam  Vitals reviewed.   Constitutional:       General: She is not in acute distress.     Appearance: Normal appearance. She is not ill-appearing or toxic-appearing.   Eyes:      Conjunctiva/sclera: Conjunctivae normal.   Cardiovascular:      Rate and Rhythm: Normal rate.   Pulmonary:      Effort: Pulmonary effort is normal.   Musculoskeletal:      Cervical back: Neck supple. No rigidity.        Legs:       Comments: Small approximately 2 cm in diameter round erythematous nonraised rash/macule with central clearing.  No scaling or scabs.  No ulcerations.  Small flesh-colored papule centrally.  No significant swelling or edema.  No induration.  No fluctuance.  Nontender.  Blanchable.   Skin:     General: Skin is warm and dry.      Findings: Rash present.   Neurological:      General: No focal deficit present.      Mental Status: She is alert and oriented to person, place, and time.   Psychiatric:         Mood and Affect: Mood normal.         Behavior: Behavior normal.         Diagnosis and associated orders:     1. Rash  - doxycycline (VIBRAMYCIN) 100 " MG Tab; Take 1 Tablet by mouth 2 times a day for 10 days.  Dispense: 20 Tablet; Refill: 0       Comments/MDM:     Unknown etiology.  Discussed differential diagnosis to include but not limited to phytophotodermatitis, target lesion, localized bug bite hypersensitivity reaction.   We will start patient on doxycycline for 10 days out of some concern for possible Lymes disease.  I do have a lower suspicion for this however.  Hydrocortisone cream if any itching.  If no improvement in 1 to 2 weeks or any worsening or new symptoms she may return to the urgent care for reevaluation.       I personally reviewed prior external notes and test results pertinent to today's visit. Pathogenesis of diagnosis discussed including typical length and natural progression. Supportive care, natural history, differential diagnoses, and indications for immediate follow-up discussed. Patient expresses understanding and agrees to plan. Patient denies any other questions or concerns.     Follow-up with the primary care physician for recheck, reevaluation, and consideration of further management.    Please note that this dictation was created using voice recognition software. I have made a reasonable attempt to correct obvious errors, but I expect that there are errors of grammar and possibly content that I did not discover before finalizing the note.    This note was electronically signed by Riky Colin PA-C         [1]   Past Medical History:  Diagnosis Date    Raynaud disease    [2]   Allergies  Allergen Reactions    Gluten Meal     Penicillins Hives and Rash

## 2025-07-14 ENCOUNTER — APPOINTMENT (OUTPATIENT)
Dept: RADIOLOGY | Facility: OTHER | Age: 40
End: 2025-07-14
Attending: FAMILY MEDICINE
Payer: COMMERCIAL

## 2025-07-14 ENCOUNTER — OFFICE VISIT (OUTPATIENT)
Dept: SPORTS MEDICINE | Facility: OTHER | Age: 40
End: 2025-07-14
Payer: COMMERCIAL

## 2025-07-14 VITALS
BODY MASS INDEX: 19.03 KG/M2 | DIASTOLIC BLOOD PRESSURE: 64 MMHG | RESPIRATION RATE: 16 BRPM | HEART RATE: 78 BPM | HEIGHT: 62 IN | SYSTOLIC BLOOD PRESSURE: 108 MMHG | TEMPERATURE: 98.4 F | WEIGHT: 103.4 LBS | OXYGEN SATURATION: 98 %
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
## 2025-07-14 DIAGNOSIS — M25.512 ACUTE PAIN OF LEFT SHOULDER: Primary | ICD-10-CM

## 2025-07-14 DIAGNOSIS — M25.512 ACUTE PAIN OF LEFT SHOULDER: ICD-10-CM

## 2025-07-14 DIAGNOSIS — S39.011A STRAIN OF RECTUS ABDOMINIS MUSCLE, INITIAL ENCOUNTER: ICD-10-CM

## 2025-07-14 PROCEDURE — 3074F SYST BP LT 130 MM HG: CPT | Performed by: FAMILY MEDICINE

## 2025-07-14 PROCEDURE — 3078F DIAST BP <80 MM HG: CPT | Performed by: FAMILY MEDICINE

## 2025-07-14 PROCEDURE — 73030 X-RAY EXAM OF SHOULDER: CPT | Mod: TC,FY,LT | Performed by: FAMILY MEDICINE

## 2025-07-14 PROCEDURE — 99214 OFFICE O/P EST MOD 30 MIN: CPT | Performed by: FAMILY MEDICINE

## 2025-07-14 ASSESSMENT — FIBROSIS 4 INDEX: FIB4 SCORE: 0.63
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
## 2025-07-16 ENCOUNTER — APPOINTMENT (OUTPATIENT)
Dept: RADIOLOGY | Facility: MEDICAL CENTER | Age: 40
End: 2025-07-16
Attending: EMERGENCY MEDICINE
Payer: COMMERCIAL

## 2025-07-16 ENCOUNTER — HOSPITAL ENCOUNTER (EMERGENCY)
Facility: MEDICAL CENTER | Age: 40
End: 2025-07-16
Attending: EMERGENCY MEDICINE
Payer: COMMERCIAL

## 2025-07-16 ENCOUNTER — PHARMACY VISIT (OUTPATIENT)
Dept: PHARMACY | Facility: MEDICAL CENTER | Age: 40
End: 2025-07-16
Payer: COMMERCIAL

## 2025-07-16 VITALS
HEART RATE: 95 BPM | HEIGHT: 62 IN | SYSTOLIC BLOOD PRESSURE: 105 MMHG | OXYGEN SATURATION: 100 % | BODY MASS INDEX: 18.82 KG/M2 | WEIGHT: 102.29 LBS | DIASTOLIC BLOOD PRESSURE: 51 MMHG | RESPIRATION RATE: 16 BRPM | TEMPERATURE: 98.8 F

## 2025-07-16 DIAGNOSIS — D25.9 UTERINE LEIOMYOMA, UNSPECIFIED LOCATION: Primary | ICD-10-CM

## 2025-07-16 LAB
ALBUMIN SERPL BCP-MCNC: 4.4 G/DL (ref 3.2–4.9)
ALBUMIN/GLOB SERPL: 1.4 G/DL
ALP SERPL-CCNC: 54 U/L (ref 30–99)
ALT SERPL-CCNC: 13 U/L (ref 2–50)
ANION GAP SERPL CALC-SCNC: 16 MMOL/L (ref 7–16)
APPEARANCE UR: CLEAR
AST SERPL-CCNC: 23 U/L (ref 12–45)
BASOPHILS # BLD AUTO: 0.5 % (ref 0–1.8)
BASOPHILS # BLD: 0.06 K/UL (ref 0–0.12)
BILIRUB SERPL-MCNC: 0.8 MG/DL (ref 0.1–1.5)
BILIRUB UR QL STRIP.AUTO: NEGATIVE
BUN SERPL-MCNC: 7 MG/DL (ref 8–22)
CALCIUM ALBUM COR SERPL-MCNC: 9.2 MG/DL (ref 8.5–10.5)
CALCIUM SERPL-MCNC: 9.5 MG/DL (ref 8.5–10.5)
CHLORIDE SERPL-SCNC: 101 MMOL/L (ref 96–112)
CO2 SERPL-SCNC: 18 MMOL/L (ref 20–33)
COLOR UR: YELLOW
CREAT SERPL-MCNC: 0.62 MG/DL (ref 0.5–1.4)
EOSINOPHIL # BLD AUTO: 0.04 K/UL (ref 0–0.51)
EOSINOPHIL NFR BLD: 0.3 % (ref 0–6.9)
ERYTHROCYTE [DISTWIDTH] IN BLOOD BY AUTOMATED COUNT: 40.6 FL (ref 35.9–50)
GFR SERPLBLD CREATININE-BSD FMLA CKD-EPI: 116 ML/MIN/1.73 M 2
GLOBULIN SER CALC-MCNC: 3.2 G/DL (ref 1.9–3.5)
GLUCOSE SERPL-MCNC: 97 MG/DL (ref 65–99)
GLUCOSE UR STRIP.AUTO-MCNC: NEGATIVE MG/DL
HCG SERPL QL: NEGATIVE
HCT VFR BLD AUTO: 39.4 % (ref 37–47)
HGB BLD-MCNC: 13.5 G/DL (ref 12–16)
IMM GRANULOCYTES # BLD AUTO: 0.05 K/UL (ref 0–0.11)
IMM GRANULOCYTES NFR BLD AUTO: 0.4 % (ref 0–0.9)
KETONES UR STRIP.AUTO-MCNC: 40 MG/DL
LEUKOCYTE ESTERASE UR QL STRIP.AUTO: NEGATIVE
LIPASE SERPL-CCNC: 38 U/L (ref 11–82)
LYMPHOCYTES # BLD AUTO: 1.4 K/UL (ref 1–4.8)
LYMPHOCYTES NFR BLD: 11.9 % (ref 22–41)
MCH RBC QN AUTO: 31.9 PG (ref 27–33)
MCHC RBC AUTO-ENTMCNC: 34.3 G/DL (ref 32.2–35.5)
MCV RBC AUTO: 93.1 FL (ref 81.4–97.8)
MICRO URNS: ABNORMAL
MONOCYTES # BLD AUTO: 0.73 K/UL (ref 0–0.85)
MONOCYTES NFR BLD AUTO: 6.2 % (ref 0–13.4)
NEUTROPHILS # BLD AUTO: 9.46 K/UL (ref 1.82–7.42)
NEUTROPHILS NFR BLD: 80.7 % (ref 44–72)
NITRITE UR QL STRIP.AUTO: NEGATIVE
NRBC # BLD AUTO: 0 K/UL
NRBC BLD-RTO: 0 /100 WBC (ref 0–0.2)
PH UR STRIP.AUTO: 5.5 [PH] (ref 5–8)
PLATELET # BLD AUTO: 320 K/UL (ref 164–446)
PMV BLD AUTO: 10.8 FL (ref 9–12.9)
POTASSIUM SERPL-SCNC: 3.7 MMOL/L (ref 3.6–5.5)
PROT SERPL-MCNC: 7.6 G/DL (ref 6–8.2)
PROT UR QL STRIP: NEGATIVE MG/DL
RBC # BLD AUTO: 4.23 M/UL (ref 4.2–5.4)
RBC UR QL AUTO: NEGATIVE
SODIUM SERPL-SCNC: 135 MMOL/L (ref 135–145)
SP GR UR STRIP.AUTO: 1.01
UROBILINOGEN UR STRIP.AUTO-MCNC: 0.2 EU/DL
WBC # BLD AUTO: 11.7 K/UL (ref 4.8–10.8)

## 2025-07-16 PROCEDURE — 83690 ASSAY OF LIPASE: CPT

## 2025-07-16 PROCEDURE — 76830 TRANSVAGINAL US NON-OB: CPT

## 2025-07-16 PROCEDURE — 36415 COLL VENOUS BLD VENIPUNCTURE: CPT

## 2025-07-16 PROCEDURE — 99284 EMERGENCY DEPT VISIT MOD MDM: CPT

## 2025-07-16 PROCEDURE — 80053 COMPREHEN METABOLIC PANEL: CPT

## 2025-07-16 PROCEDURE — 81003 URINALYSIS AUTO W/O SCOPE: CPT

## 2025-07-16 PROCEDURE — 96376 TX/PRO/DX INJ SAME DRUG ADON: CPT

## 2025-07-16 PROCEDURE — 96374 THER/PROPH/DIAG INJ IV PUSH: CPT

## 2025-07-16 PROCEDURE — 700117 HCHG RX CONTRAST REV CODE 255: Performed by: EMERGENCY MEDICINE

## 2025-07-16 PROCEDURE — 700111 HCHG RX REV CODE 636 W/ 250 OVERRIDE (IP): Mod: JZ | Performed by: EMERGENCY MEDICINE

## 2025-07-16 PROCEDURE — RXMED WILLOW AMBULATORY MEDICATION CHARGE: Performed by: EMERGENCY MEDICINE

## 2025-07-16 PROCEDURE — 96375 TX/PRO/DX INJ NEW DRUG ADDON: CPT

## 2025-07-16 PROCEDURE — 74177 CT ABD & PELVIS W/CONTRAST: CPT

## 2025-07-16 PROCEDURE — 84703 CHORIONIC GONADOTROPIN ASSAY: CPT

## 2025-07-16 PROCEDURE — 85025 COMPLETE CBC W/AUTO DIFF WBC: CPT

## 2025-07-16 RX ORDER — ONDANSETRON 2 MG/ML
4 INJECTION INTRAMUSCULAR; INTRAVENOUS ONCE
Status: COMPLETED | OUTPATIENT
Start: 2025-07-16 | End: 2025-07-16

## 2025-07-16 RX ORDER — MORPHINE SULFATE 4 MG/ML
4 INJECTION INTRAVENOUS ONCE
Status: COMPLETED | OUTPATIENT
Start: 2025-07-16 | End: 2025-07-16

## 2025-07-16 RX ORDER — IBUPROFEN 800 MG/1
800 TABLET, FILM COATED ORAL EVERY 8 HOURS PRN
Qty: 30 TABLET | Refills: 0 | Status: SHIPPED | OUTPATIENT
Start: 2025-07-16

## 2025-07-16 RX ORDER — HYDROMORPHONE HYDROCHLORIDE 1 MG/ML
1 INJECTION, SOLUTION INTRAMUSCULAR; INTRAVENOUS; SUBCUTANEOUS ONCE
Status: COMPLETED | OUTPATIENT
Start: 2025-07-16 | End: 2025-07-16

## 2025-07-16 RX ORDER — OXYCODONE AND ACETAMINOPHEN 5; 325 MG/1; MG/1
1 TABLET ORAL EVERY 6 HOURS PRN
Qty: 16 TABLET | Refills: 0 | Status: SHIPPED | OUTPATIENT
Start: 2025-07-16 | End: 2025-07-22

## 2025-07-16 RX ADMIN — FENTANYL CITRATE 100 MCG: 50 INJECTION, SOLUTION INTRAMUSCULAR; INTRAVENOUS at 20:48

## 2025-07-16 RX ADMIN — ONDANSETRON 4 MG: 2 INJECTION INTRAMUSCULAR; INTRAVENOUS at 18:08

## 2025-07-16 RX ADMIN — ONDANSETRON 4 MG: 2 INJECTION INTRAMUSCULAR; INTRAVENOUS at 21:34

## 2025-07-16 RX ADMIN — MORPHINE SULFATE 4 MG: 4 INJECTION, SOLUTION INTRAMUSCULAR; INTRAVENOUS at 18:08

## 2025-07-16 RX ADMIN — HYDROMORPHONE HYDROCHLORIDE 1 MG: 1 INJECTION, SOLUTION INTRAMUSCULAR; INTRAVENOUS; SUBCUTANEOUS at 20:32

## 2025-07-16 RX ADMIN — IOHEXOL 85 ML: 350 INJECTION, SOLUTION INTRAVENOUS at 19:33

## 2025-07-16 ASSESSMENT — PAIN DESCRIPTION - PAIN TYPE: TYPE: ACUTE PAIN

## 2025-07-16 ASSESSMENT — FIBROSIS 4 INDEX: FIB4 SCORE: 0.63

## 2025-07-16 NOTE — Clinical Note
REFERRAL APPROVAL NOTICE         Sent on July 15, 2025                   Cristina Amador  [address] [facility] [place] [address]                   Dear Ms. Amador,    After a careful review of the medical information and benefit coverage, [facility] has processed your referral. See below for additional details.    If applicable, you must be actively enrolled with your insurance for coverage of the authorized service. If you have any questions regarding your coverage, please contact your insurance directly.    REFERRAL INFORMATION   Referral #:  [id number]  Referred-To Provider    Referred-By Provider:  Physical Therapy    Khoa Nance M.D.   [facility]      [address]  [facility] [address]  [phone] [address]  [facility] [address]  [phone]    Referral Start Date:  07/14/2025  Referral End Date:   07/14/2026             SCHEDULING  If you do not already have an appointment, please call [phone] to make an appointment.     MORE INFORMATION  If you do not already have a Webs account, sign up at: [email or web address]  You can access your medical information, make appointments, see lab results, billing information, and more.  If you have questions regarding this referral, please contact  the [facility] Referrals department at:             [phone]. Monday - Friday 8:00AM - 5:00PM.     Sincerely,    [facility]

## 2025-07-16 NOTE — ED TRIAGE NOTES
"Chief Complaint   Patient presents with    Abdominal Pain     Pt c/o pain to lower abdomen/pelvic region for the past 17 hours.  Pt tried simethicone without relief.  Describes pain as sharp, shooting pains.       Pt ambulatory from lobby with steady gait. Denies N/V. Pt states pain worsens with urination, but denies burning pain.  Pt states had two BMs today. Abdominal pain protocol ordered.      Pt is alert and oriented, speaking in full sentences, follows commands and responds appropriately to questions. Resp are even and unlabored.      Pt placed in lobby. Pt educated on triage process. Pt encouraged to alert staff for any changes.     Patient and staff wearing appropriate PPE.    /62   Pulse 84   Temp 37.1 °C (98.7 °F) (Temporal)   Resp 18   Ht 1.575 m (5' 2\")   Wt 46.4 kg (102 lb 4.7 oz)   SpO2 100%      "

## 2025-07-16 NOTE — Clinical Note
Cristina Amador was seen and treated in our emergency department on 7/16/2025.  She may return to work on 07/22/2025.       If you have any questions or concerns, please don't hesitate to call.      Eduardo Finch D.O.

## 2025-07-17 NOTE — ED PROVIDER NOTES
"ED Provider Note    CHIEF COMPLAINT  Chief Complaint   Patient presents with    Abdominal Pain     Pt c/o pain to lower abdomen/pelvic region for the past 17 hours.  Pt tried simethicone without relief.  Describes pain as sharp, shooting pains.         EXTERNAL RECORDS REVIEWED  Outpatient Notes  urgent care, [facility]    HPI/ROS  LIMITATION TO HISTORY   None  OUTSIDE HISTORIAN(S):  None    Cristina Amador is a 40 y.o. female who presents here for evaluation of abdominal pain.  The patient states that she has had suprapubic and right lower quadrant abdominal pain for about 15 or so hours.    PAST MEDICAL HISTORY   has a past medical history of Raynaud disease.    SURGICAL HISTORY   has a past surgical history that includes pb incise finger tendon sheath (Left, 12/8/2023).    FAMILY HISTORY  No family history on file.    SOCIAL HISTORY  Social History     Tobacco Use    Smoking status: Never    Smokeless tobacco: Never   Vaping Use    Vaping status: Never Used   Substance and Sexual Activity    Alcohol use: Never    Drug use: Never    Sexual activity: Not on file       CURRENT MEDICATIONS  Home Medications       Reviewed by Asia Ta R.N. (Registered Nurse) on 07/16/25 at 1633  Med List Status: Not Addressed     Medication Last Dose Status   methylPREDNISolone (MEDROL DOSEPAK) 4 MG Tablet Therapy Pack  Active   valacyclovir (VALTREX) 1 GM Tab  Active   valACYclovir (VALTREX) 500 MG Tab  Active                  Audit from Redirected Encounters    **Home medications have not yet been reviewed for this encounter**         ALLERGIES  Allergies[1]    PHYSICAL EXAM  VITAL SIGNS: /70   Pulse 94   Temp 37.1 °C (98.7 °F) (Temporal)   Resp (!) 25   Ht 1.575 m (5' 2\")   Wt 46.4 kg (102 lb 4.7 oz)   LMP 06/23/2025 (Exact Date)   SpO2 100%   BMI 18.71 kg/m²    Constitutional: Well developed, well nourished. No acute distress.  HEENT: Normocephalic, atraumatic. Posterior pharynx clear and moist.  Eyes:  EOMI. " Normal sclera.  Neck: Supple, Full range of motion, nontender.  Chest/Pulmonary: clear to ausculation. Symmetrical expansion.   Cardio: Regular rate and rhythm with no murmur.   Abdomen:   tenderness to the right lower quad and suprapubic regions, No peritoneal signs. No guarding. No palpable masses.  Musculoskeletal: No deformity, no edema, neurovascular intact.   Neuro: Clear speech, appropriate, cooperative, cranial nerves II-XII grossly intact.  Psych: Normal mood and affect      EKG/LABS  Results for orders placed or performed during the hospital encounter of 07/16/25   CBC WITH DIFFERENTIAL    Collection Time: 07/16/25  4:39 PM   Result Value Ref Range    WBC 11.7 (H) 4.8 - 10.8 K/uL    RBC 4.23 4.20 - 5.40 M/uL    Hemoglobin 13.5 12.0 - 16.0 g/dL    Hematocrit 39.4 37.0 - 47.0 %    MCV 93.1 81.4 - 97.8 fL    MCH 31.9 27.0 - 33.0 pg    MCHC 34.3 32.2 - 35.5 g/dL    RDW 40.6 35.9 - 50.0 fL    Platelet Count 320 164 - 446 K/uL    MPV 10.8 9.0 - 12.9 fL    Neutrophils-Polys 80.70 (H) 44.00 - 72.00 %    Lymphocytes 11.90 (L) 22.00 - 41.00 %    Monocytes 6.20 0.00 - 13.40 %    Eosinophils 0.30 0.00 - 6.90 %    Basophils 0.50 0.00 - 1.80 %    Immature Granulocytes 0.40 0.00 - 0.90 %    Nucleated RBC 0.00 0.00 - 0.20 /100 WBC    Neutrophils (Absolute) 9.46 (H) 1.82 - 7.42 K/uL    Lymphs (Absolute) 1.40 1.00 - 4.80 K/uL    Monos (Absolute) 0.73 0.00 - 0.85 K/uL    Eos (Absolute) 0.04 0.00 - 0.51 K/uL    Baso (Absolute) 0.06 0.00 - 0.12 K/uL    Immature Granulocytes (abs) 0.05 0.00 - 0.11 K/uL    NRBC (Absolute) 0.00 K/uL   COMP METABOLIC PANEL    Collection Time: 07/16/25  4:39 PM   Result Value Ref Range    Sodium 135 135 - 145 mmol/L    Potassium 3.7 3.6 - 5.5 mmol/L    Chloride 101 96 - 112 mmol/L    Co2 18 (L) 20 - 33 mmol/L    Anion Gap 16.0 7.0 - 16.0    Glucose 97 65 - 99 mg/dL    Bun 7 (L) 8 - 22 mg/dL    Creatinine 0.62 0.50 - 1.40 mg/dL    Calcium 9.5 8.5 - 10.5 mg/dL    Correct Calcium 9.2 8.5 - 10.5 mg/dL     AST(SGOT) 23 12 - 45 U/L    ALT(SGPT) 13 2 - 50 U/L    Alkaline Phosphatase 54 30 - 99 U/L    Total Bilirubin 0.8 0.1 - 1.5 mg/dL    Albumin 4.4 3.2 - 4.9 g/dL    Total Protein 7.6 6.0 - 8.2 g/dL    Globulin 3.2 1.9 - 3.5 g/dL    A-G Ratio 1.4 g/dL   LIPASE    Collection Time: 07/16/25  4:39 PM   Result Value Ref Range    Lipase 38 11 - 82 U/L   HCG QUAL SERUM    Collection Time: 07/16/25  4:39 PM   Result Value Ref Range    Beta-Hcg Qualitative Serum Negative Negative   ESTIMATED GFR    Collection Time: 07/16/25  4:39 PM   Result Value Ref Range    GFR (CKD-EPI) 116 >60 mL/min/1.73 m 2   URINALYSIS,CULTURE IF INDICATED    Collection Time: 07/16/25  5:47 PM    Specimen: Urine   Result Value Ref Range    Color Yellow     Character Clear     Specific Gravity 1.011 <1.035    Ph 5.5 5.0 - 8.0    Glucose Negative Negative mg/dL    Ketones 40 (A) Negative mg/dL    Protein Negative Negative mg/dL    Bilirubin Negative Negative    Urobilinogen, Urine 0.2 <=1.0 EU/dL    Nitrite Negative Negative    Leukocyte Esterase Negative Negative    Occult Blood Negative Negative    Micro Urine Req see below          RADIOLOGY/PROCEDURES   I have independently interpreted the diagnostic imaging associated with this visit and am waiting the final reading from the radiologist.   My preliminary interpretation is as follows: below     Radiologist interpretation:  US-PELVIC COMPLETE (TRANSABDOMINAL/TRANSVAGINAL) (COMBO)   Final Result      1.  Two uterine fibroids, with the larger 7.3 cm subserosal fibroid corresponding to the uterine abnormality seen on the recent CT   2.  Normal ovaries.   3.  Trace pelvic free fluid.      CT-ABDOMEN-PELVIS WITH   Final Result      1.  Heterogeneous hypoechoic lesion arising from the anterior aspect of the upper uterine segment measuring 7.8 x 6.4 x 7.3 cm in diameter. Differential diagnosis includes a large uterine fibroid, an endometrioma and less likely a uterine neoplasm.    Further evaluation with  pelvic sonography is recommended.   2.  The remainder of the CT of the abdomen and pelvis with IV contrast is within normal limits.             COURSE & MEDICAL DECISION MAKING    ASSESSMENT, COURSE AND PLAN  Care Narrative: This is a 40-year-old female here for evaluation of abdominal pain.  It is noted that on CT scan, she had a lesion, that needed to be further evaluated with ultrasound.  The patient had an ultrasound showing uterine fibroids.  She has been given pain medications to  tonight, she has been given GYN to follow-up with, and at this time, she is nontoxic-appearing afebrile and comfortable plan.        DISPOSITION AND DISCUSSIONS  I have discussed management of the patient with the following physicians and CHINO's:  none    Discussion of management with other QHP or appropriate source(s): none     Escalation of care considered, and ultimately not performed:none    Barriers to care at this time, including but not limited to: none.     Decision tools and prescription drugs considered including, but not limited to: none.    FINAL DIAGNOSIS  1. Uterine leiomyoma, unspecified location         Electronically signed by: Eduardo Finch D.O., 7/16/2025 7:21 PM           [1]   Allergies  Allergen Reactions    Gluten Meal     Penicillins Hives and Rash

## 2025-07-19 ENCOUNTER — HOSPITAL ENCOUNTER (OUTPATIENT)
Dept: LAB | Facility: MEDICAL CENTER | Age: 40
End: 2025-07-19
Attending: OBSTETRICS & GYNECOLOGY
Payer: COMMERCIAL

## 2025-07-19 LAB
CANCER AG125 SERPL-ACNC: 23.6 U/ML (ref 0–35)
LDH SERPL L TO P-CCNC: 245 U/L (ref 107–266)

## 2025-07-19 PROCEDURE — 86304 IMMUNOASSAY TUMOR CA 125: CPT

## 2025-07-19 PROCEDURE — 36415 COLL VENOUS BLD VENIPUNCTURE: CPT

## 2025-07-19 PROCEDURE — 83615 LACTATE (LD) (LDH) ENZYME: CPT

## 2025-07-25 ENCOUNTER — OFFICE VISIT (OUTPATIENT)
Dept: GYNECOLOGY | Facility: CLINIC | Age: 40
End: 2025-07-25
Payer: COMMERCIAL

## 2025-07-25 VITALS
SYSTOLIC BLOOD PRESSURE: 104 MMHG | DIASTOLIC BLOOD PRESSURE: 70 MMHG | HEART RATE: 82 BPM | BODY MASS INDEX: 18.4 KG/M2 | HEIGHT: 62 IN | WEIGHT: 100 LBS

## 2025-07-25 DIAGNOSIS — D21.9 FIBROIDS: Primary | ICD-10-CM

## 2025-07-25 ASSESSMENT — FIBROSIS 4 INDEX: FIB4 SCORE: 0.78

## 2025-07-25 NOTE — PROGRESS NOTES
Minimally Invasive Gynecologic Surgery Consult      CC: fibroids      HPI  Cristina Amador is a 40 y.o. female  presenting today for the above.  Patient with fibroid uterus and the following symptoms:  - Heavy menstrual bleeding: no  - Dysmenorrhea: no  - Bulk symptoms: no  - Dyspareunia: no  - Bladder symptoms: no  - Bowel symptoms: no  - Desires future fertility: no    Of note has bloating with her periods.  Periods regular, monthly.    Previous treatment:  - Medical: none  - Surgical: none    She presented to the ED couple weeks ago with acute onset pelvic pain.  Was diagnosed with a 7 cm uterine fibroid.  Trace free fluid in the pelvis.  Suspicion for ruptured ovarian cyst as well due to pain.    Patient interested in radioablative procedures such as Acessa.    Imaging  Pelvic US   FINDINGS:  Both transabdominal and transvaginal scanning were performed to optimally visualize the pelvis.     The uterus measures 4.70 cm x 7.06 cm x 5.09 cm.  The uterine myometrium is fairly homogeneous. There is a large subserosal uterine fibroids in the uterine fundus. It measures 7.3 x 5.6 x 7 cm. A smaller fundal fibroid measures 1.4 cm.  The endometrial echo complex measures 1.14 cm.     Low resistive waveforms are confirmed within the ovaries.  The right ovary measures 3.44 cm x 1.97 cm x 3.79 cm. No suspicious adnexal masses.     The left ovary measures 2.61 cm x 1.80 cm x 2.34 cm. No suspicious adnexal masses.     Trace pelvic free fluid.     IMPRESSION:     1.  Two uterine fibroids, with the larger 7.3 cm subserosal fibroid corresponding to the uterine abnormality seen on the recent CT  2.  Normal ovaries.  3.  Trace pelvic free fluid.    Menstrual History  Patient's last menstrual period was 2025 (exact date).  Regular, monthly, normal flow     Gynecologic History  Last pap:   Hx abnormal pap smears: yes  Hx of PID/STDs: no  Contraception plan: none      OB History    Para Term  AB  Living   1    1    SAB IAB Ectopic Molar Multiple Live Births   1           # Outcome Date GA Lbr Beto/2nd Weight Sex Type Anes PTL Lv   1 SAB                Past Medical History  Past Medical History[1]    Past Surgical History  Past Surgical History[2]    Social History  Social History[3]     Family History  No family history on file.    Home Medications  Current Outpatient Medications   Medication Sig    ibuprofen (MOTRIN) 800 MG Tab Take 1 Tablet by mouth every 8 hours as needed for Mild Pain.    methylPREDNISolone (MEDROL DOSEPAK) 4 MG Tablet Therapy Pack Follow schedule on package instructions. (Patient not taking: Reported on 6/19/2025)    valacyclovir (VALTREX) 1 GM Tab Take 500 mg by mouth 2 times a day. (Patient not taking: Reported on 6/19/2025)    valACYclovir (VALTREX) 500 MG Tab Take 1 Tablet by mouth every day. (Patient not taking: Reported on 6/19/2025)       Allergies/Reactions  Allergies[4]       ROS: I have reviewed all systems with patient. Pertinent positive and negative findings are listed below except for what is otherwise stated in the History of Present Illness.       Objective:    Labs  Lab Results   Component Value Date/Time    WBC 11.7 (H) 07/16/2025 04:39 PM    RBC 4.23 07/16/2025 04:39 PM    HEMOGLOBIN 13.5 07/16/2025 04:39 PM    HEMATOCRIT 39.4 07/16/2025 04:39 PM    MCV 93.1 07/16/2025 04:39 PM    MCH 31.9 07/16/2025 04:39 PM    MCHC 34.3 07/16/2025 04:39 PM    RDW 40.6 07/16/2025 04:39 PM    PLATELETCT 320 07/16/2025 04:39 PM    MPV 10.8 07/16/2025 04:39 PM    NEUTSPOLYS 80.70 (H) 07/16/2025 04:39 PM    LYMPHOCYTES 11.90 (L) 07/16/2025 04:39 PM    MONOCYTES 6.20 07/16/2025 04:39 PM    EOSINOPHILS 0.30 07/16/2025 04:39 PM    BASOPHILS 0.50 07/16/2025 04:39 PM    IMMGRAN 0.40 07/16/2025 04:39 PM    NRBC 0.00 07/16/2025 04:39 PM    NEUTS 9.46 (H) 07/16/2025 04:39 PM    LYMPHS 1.40 07/16/2025 04:39 PM    MONOS 0.73 07/16/2025 04:39 PM    EOS 0.04 07/16/2025 04:39 PM    BASO 0.06  "07/16/2025 04:39 PM    IMMGRANAB 0.05 07/16/2025 04:39 PM    NRBCAB 0.00 07/16/2025 04:39 PM       No results found for: \"HBA1C\"      Physical Exam  /70 (BP Location: Right arm, Patient Position: Sitting, BP Cuff Size: Adult)   Pulse 82   Ht 5' 2\"   Wt 100 lb   LMP 07/18/2025 (Exact Date)   BMI 18.29 kg/m²    Patient's last menstrual period was 07/18/2025 (exact date).  Body mass index is 18.29 kg/m².    General: alert, well appearing, and in no distress  Neurological: alert, oriented, normal speech, no focal findings or movement disorder noted  Respiratory: no tachypnea, retractions or cyanosis  Abdominal: soft, nontender, nondistended, no masses or organomegaly, unable to palpate fibroid         Assessment:  50-year-old P0  7 cm subserosal fibroid  Fairly asymptomatic         Plan:  1. Fibroids  We reviewed the diagnosis of uterine fibroids, which are benign tumors of the uterus. Imaging was reviewed with the patient and illustrations of normal pelvic anatomy and uterine fibroids were shown. It was explained that ovarian hormones are thought to enhance the growth and survival of fibroids, that they have very low malignant potential, and that symptoms vary widely between patients. Depending on the size, number, and location of the fibroids, symptoms can include heavy bleeding, intermenstrual bleeding, pelvic pressure, constipation, urinary disturbances, and pelvic pain. The patient was also counselled that fibroid symptoms tend to recede after menopause with the decrease in ovarian hormones. We also reviewed her future fertility plans; she does not desire future pregnancies.      Treatment options were reviewed, including expectant management, medical management (OCPs, POPs, IUDs, Lupron) and surgical management (uterine artery embolization, myomectomy, radiofrequency ablation, hysterectomy).  We reviewed risks, side effects, success rates, and contraindications.  Discussed laparoscopic myomectomy in " details as she would like less incisions than robotic myomectomy.  I believe this is reasonable option as she has a narrow waist, therefore adequate placement of robotic ports may be challenging.  Patient will take time to consider her options and will notify me of her decision.        Danay Rice MD         [1]   Past Medical History:  Diagnosis Date    Raynaud disease    [2]   Past Surgical History:  Procedure Laterality Date    PB INCISE FINGER TENDON SHEATH Left 12/8/2023    Procedure: LEFT SMALL FINGER TRIGGER RELEASE;  Surgeon: Delmi Sanders M.D.;  Location: [facility];  Service: Orthopedics   [3]   Social History  Tobacco Use    Smoking status: Never    Smokeless tobacco: Never   Vaping Use    Vaping status: Never Used   Substance Use Topics    Alcohol use: Never    Drug use: Never   [4]   Allergies  Allergen Reactions    Gluten Meal     Penicillins Hives and Rash

## 2025-07-25 NOTE — PROGRESS NOTES
Pt here for ER fu  Pt denies pain, states it is very minimal and denies vaginal bleeding  US/CT: Heterogeneous hypoechoic lesion arising from the anterior aspect of the upper uterine segment measuring 7.8 x 6.4 x 7.3 cm in diameter. Differential diagnosis includes a large uterine fibroid, an endometrioma and less likely a uterine neoplasm. Two uterine fibroids, with the larger 7.3 cm subserosal fibroid corresponding to the uterine abnormality seen on the recent CT   Pharmacy confirmed.   Good #[phone]